# Patient Record
(demographics unavailable — no encounter records)

---

## 2024-11-03 NOTE — ASSESSMENT
[FreeTextEntry1] : Patient is a 69 yo F who presents with R kidney stone.  She is voiding well Again encouraged stone diet and K citrate Renal sono today shows relatively stable RLP stone. Will cont observation She is seeing Nephrology for medical mgmt Plan for f/u 12 mo for repeat renal sono

## 2024-11-03 NOTE — HISTORY OF PRESENT ILLNESS
[FreeTextEntry1] : Patient is a 69 yo F who presents for R intrarenal stone  HPI/Prior hx: She reports baseline urinary symptoms of nocturia x4. No fever/chills, no nausea/vomiting. No incontinence, dysuria or hematuria.  Her LUTS did seem to improve with PFPT previously. She did see PCP and UA in 9/2021 showed 9 rbcs/hpf. Cysto and renal ULT in 10/15/21 - R intrarenal stones noted, no obvious L stone. Neg cysto. She drinks ~64 oz of water and 2 cups of tea usually daily. She did have PCNL stone surgery 2012 at [facility]. Most recent renal ULT with b/l subcentimeter stones in 2020 and renal atrophy then. CT scan 3/30/22 - There is a 8mm nonobstructing R kidney stone - no hydro and stone appears to extend from R lower pole to pelvis. CT report describes as pelvic stone, but more within RLP.  9/82023 She presents to follow up today. She reports urination is better as compared to prior. She denied any urinary frequency, urgency, gross hematuria, dysuria, abdominal/flank pain or fever/chills.  She drinks 8 cups of water in the daytime, does not drink excessive water at bedtime, only sips some water at night when she feels thirsty.  Previously saw Nephrology and is taking K citrate. She has not noticed any stone passed. No recurrent urinary tract infection.  Renal ULT in 3/2023 showed right kidney mildly echogenic and atrophic with an upper pole simple cyst measuring 0.8 x 0.7 x 0.8 cm. Left kidney appears to be unremarkable. No hydronephrosis, stones or solid masses present. Renal ULT 9/2023 shows right lower pole ~8mm stone, similar to prior CT, unchanged.  No hydronephrosis.  2/2024 - Renal ULT today shows stable R 7mm stone, no hydro.  No L sided stone.  R renal atrophy/echogenicity.  11/1/24 Renal ULT today shows stable R 7mm stone with adjacent punctate stone, no hydro.  No L sided stone.  R renal atrophy/echogenicity. She is following with Nephrology/Dr Reyes.  Still on K citrate. Denies any pain, UTI/dysuria, hematuria.  She has cut down on salt and is drinking lots of water.  She does have urinary frequency from her water intake.

## 2024-11-03 NOTE — ASSESSMENT
[FreeTextEntry1] : Patient is a 71 yo F who presents with R kidney stone.  She is voiding well Again encouraged stone diet and K citrate Renal sono today shows relatively stable RLP stone. Will cont observation She is seeing Nephrology for medical mgmt Plan for f/u 12 mo for repeat renal sono

## 2024-11-03 NOTE — HISTORY OF PRESENT ILLNESS
[FreeTextEntry1] : Patient is a 69 yo F who presents for R intrarenal stone  HPI/Prior hx: She reports baseline urinary symptoms of nocturia x4. No fever/chills, no nausea/vomiting. No incontinence, dysuria or hematuria.  Her LUTS did seem to improve with PFPT previously. She did see PCP and UA in 9/2021 showed 9 rbcs/hpf. Cysto and renal ULT in 10/15/21 - R intrarenal stones noted, no obvious L stone. Neg cysto. She drinks ~64 oz of water and 2 cups of tea usually daily. She did have PCNL stone surgery 2012 at [facility]. Most recent renal ULT with b/l subcentimeter stones in 2020 and renal atrophy then. CT scan 3/30/22 - There is a 8mm nonobstructing R kidney stone - no hydro and stone appears to extend from R lower pole to pelvis. CT report describes as pelvic stone, but more within RLP.  9/82023 She presents to follow up today. She reports urination is better as compared to prior. She denied any urinary frequency, urgency, gross hematuria, dysuria, abdominal/flank pain or fever/chills.  She drinks 8 cups of water in the daytime, does not drink excessive water at bedtime, only sips some water at night when she feels thirsty.  Previously saw Nephrology and is taking K citrate. She has not noticed any stone passed. No recurrent urinary tract infection.  Renal ULT in 3/2023 showed right kidney mildly echogenic and atrophic with an upper pole simple cyst measuring 0.8 x 0.7 x 0.8 cm. Left kidney appears to be unremarkable. No hydronephrosis, stones or solid masses present. Renal ULT 9/2023 shows right lower pole ~8mm stone, similar to prior CT, unchanged.  No hydronephrosis.  2/2024 - Renal ULT today shows stable R 7mm stone, no hydro.  No L sided stone.  R renal atrophy/echogenicity.  11/1/24 Renal ULT today shows stable R 7mm stone with adjacent punctate stone, no hydro.  No L sided stone.  R renal atrophy/echogenicity. She is following with Nephrology/Dr Reyes.  Still on K citrate. Denies any pain, UTI/dysuria, hematuria.  She has cut down on salt and is drinking lots of water.  She does have urinary frequency from her water intake.

## 2024-12-30 NOTE — PHYSICAL EXAM
[No Acute Distress] : no acute distress [Well Nourished] : well nourished [Well Developed] : well developed [Well-Appearing] : well-appearing [Normal Outer Ear/Nose] : the outer ears and nose were normal in appearance [Normal Oropharynx] : the oropharynx was normal [Supple] : supple [No Respiratory Distress] : no respiratory distress  [No Accessory Muscle Use] : no accessory muscle use [Clear to Auscultation] : lungs were clear to auscultation bilaterally [Normal Rate] : normal rate  [Regular Rhythm] : with a regular rhythm [Normal S1, S2] : normal S1 and S2 [No Edema] : there was no peripheral edema [Soft] : abdomen soft [Non Tender] : non-tender [Non-distended] : non-distended [Speech Grossly Normal] : speech grossly normal [Memory Grossly Normal] : memory grossly normal [Normal Affect] : the affect was normal [Normal Mood] : the mood was normal

## 2024-12-31 NOTE — HISTORY OF PRESENT ILLNESS
[FreeTextEntry1] : follow up [de-identified] : 69F with multiple severe allergies, chronic idiopathic urticaria, dermatitis, osteoporosis, recurrent kidney stones, R renal atrophy, HTN, HLD, GERD, pseudogout, and latent TB s/p treatment presenting for follow up. Patient states she has been doing well. 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 She is enjoying her work. She sometimes feels scratchy/itchiness in her throat that improves with nasal sprays. Denies trouble breathing or rash. She continues to be cautious with her eating and walks a lot. Missed her endocrine appointment but will reschedule. Got her repeat thyroid US done. Saw urology in the interim, kidney stone stable. She is taking potassium citrate. Denies dysuria or hematuria. Has not seen GI for colonoscopy, still does not feel ready to go through it.

## 2024-12-31 NOTE — ASSESSMENT
[FreeTextEntry1] : 69F with multiple severe allergies, chronic idiopathic urticaria, dermatitis, osteoporosis, recurrent kidney stones, R renal atrophy, HTN, HLD, GERD, pseudogout, and latent TB s/p treatment presenting for follow up.  #HCM - received flu & COVID in 10/2024 at outside pharmacy per patient - UTD on vaccines, received 2 doses for shingles (not recorded in chart) - mammo in 2026, finished cervical cancer screening - FIT test negative, repeat with next CPE as patient refused colonoscopy - repeat DEXA in 5/2025   Cased discussed with Dr. Milton RTC in 5/2024 for CPE  Micaela Echevarria PGY-3, [employer] [facility]

## 2024-12-31 NOTE — REVIEW OF SYSTEMS
[Fever] : no fever [Nosebleed] : no nosebleeds [Chest Pain] : no chest pain [Shortness Of Breath] : no shortness of breath [Cough] : no cough [Abdominal Pain] : no abdominal pain [Diarrhea] : diarrhea [Dysuria] : no dysuria [Hematuria] : no hematuria [Skin Rash] : no skin rash

## 2024-12-31 NOTE — HISTORY OF PRESENT ILLNESS
[FreeTextEntry1] : follow up [de-identified] : 69F with multiple severe allergies, chronic idiopathic urticaria, dermatitis, osteoporosis, recurrent kidney stones, R renal atrophy, HTN, HLD, GERD, pseudogout, and latent TB s/p treatment presenting for follow up. Patient states she has been doing well. 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 She is enjoying her work. She sometimes feels scratchy/itchiness in her throat that improves with nasal sprays. Denies trouble breathing or rash. She continues to be cautious with her eating and walks a lot. Missed her endocrine appointment but will reschedule. Got her repeat thyroid US done. Saw urology in the interim, kidney stone stable. She is taking potassium citrate. Denies dysuria or hematuria. Has not seen GI for colonoscopy, still does not feel ready to go through it.

## 2025-01-14 NOTE — END OF VISIT
[] : Fellow [FreeTextEntry3] : Patient is a 71F here for follow up. For osteoporosis, clinically stable while on fosamax weekly since 2020, resumed after brief drug holiday in 2019 due to BMD decrease in LS. Recent DXA on 2023 with no signifcant BMD change. Will repeat DXA this year to trend. Patient has planned dental work but not sure when. Advised to inform us when dental procedure is, so fosamax maybe held 4 weeks appropriately. Blood work to be done with pcp for vitD and Ca For thyroid nodules, stable multinodular goiter. Denies compressive symptoms. Will monitor with annual thyroid US later this year. [Time Spent: ___ minutes] : I have spent [unfilled] minutes of time on the encounter which excludes teaching and separately reported services.

## 2025-01-14 NOTE — HISTORY OF PRESENT ILLNESS
[FreeTextEntry1] : 71 yr F with osteoporosis, thyroid nodule, hx of latent TB, HTN, HLD, multiple allergies, chronic urticaria following up for management of osteoporosis and thyroid nodule.   Osteoporosis, postmenopausal: Diagnosed 2014 (DEXA June 2014: spine -2.5, T-hip -2.9, and fem neck -3.2).  No hx of parathyroid disorders. Never on HRT. No chronic steroid use, previously intermittent use of steroids when patient has allergic reactions. Now on Xolair (Omalizumab). At age 43 patient had menopause. Was initiated on Alendronate and continued for 5 years (June 2014-2019) with good response to therapy, then took drug holiday. Drug holiday ended July 2020 when DEXA showed worsening of spine (-1.4 in June 2018 to -1.9 in July 2020) (although total hip significantly improved). She was restarted on Alendronate 70 mg/weekly at that time and has since been continuing. Patient fractured ribs 6 and 7 in March 2021 after mechanical fall from standing height. No other fractures as a result of that fall. No additional falls or fractures. PCP stopped calcium 600 mg daily due to kidney impairment and kidney stones some time ago. 24 hr urine did not show hypercalciuria. DEXA in Aug 2021 stable compared to previous but did have improvement in spine (4.7%). DEXA in April 2023 had worsening in spine, stable in fem neck, improvement in total hip.  Walks 30-60 minutes daily. Takes Vit D 1000 units daily. UTD with dental. Has an impacted wisdom tooth that needs extraction. She is not sure when this will be scheduled for, not urgent. No back pain. No interval falls/fractures  Bone Densities as follows   June 2014 DEXA Spine -2.5 T hip -2.9 Fem neck -3.2  June 2016 Spine -1.9 T hip -2.5 Fem neck -3.1  June 2018 Spine -1.4 T hip -2.5 Fem neck -2.8  July 2020 Spine -1.9 T hip -1.8 Fem Neck -2.8  August 2021: Spine -1.6 (+4.7%) Total Hip -2.0  Fem neck -2.9   April 2023 Spine -2.2 Total hip -1.8 (+4.4%) Fem neck -2.9  Thyroid Nodule: First noted on CT scan in 2013 (performed for tracheal deviation seen on CXR)  Thyroid sono 1/2020 showed 3.2cm solid isoechoic nodule in right mid pole and similar appearing 1cm nodule in right upper pole. 8/2021 sono was unchanged. FNA of nodule 7/2020 which was benign, consistent with nodular goiter. TSH 1.43 (2019), Free T4 1.33 (2020), TPO Ab wnl (2020). Thyroid US 4/2023 was stable: R nodule 3.3x2.8x2.3 isoechoic nodule. Smaller subcm nodule in R lobe. Subcm colloid cysts in L lobe. Thyroid US 11/2024: stable 3.2cm RLP nodule. subcm RUP nodule and scattered left subcm spongiform nodules/colloid cysts. New 1.5cm isthmus nodule noted.  Patient does not report SOB, dysphagia, neck pain or growth in neck, no symptoms of hypo/hyperthyroidism. No hx of head or neck radiation. Mother has history of thyroid nodule and paternal aunt has thyroid disease.  Prediabetes A1c 5.9% in 5/2024. Not on medication. On primarily plant based diet. Keeps active.

## 2025-01-14 NOTE — ASSESSMENT
[FreeTextEntry1] : 71 yr F with osteoporosis, thyroid nodule, hx of latent TB, HTN, HLD, multiple allergies, chronic urticaria following up for management of osteoporosis and thyroid nodule.   #Osteoporosis, post-menopausal S/p Alendronate course x 5 years ([id number]) with good response in BMD. Went on drug holiday in 2019. Re-started Alendronate in 2020 due slightly worsening of osteopenia in spine, however there was significant improvement in total hip. Last DEXA 4/2023 with worsening in spine (-1.6 to -2.2) still within osteopenic range, stable in fem neck, and improvement in total hip (+4.4%). - continue Alendronate 70mg weekly given previous very good response, although worsening in spine it is still within osteopenic range, improvement in total hip.  - appropriate calcium in diet discussed. patient eating adequate vegetables with good calcium content. - c/w vitamin D 1000 IU daily. Taken off calcium by PCP due to hx of kidney stones. - Needs extraction of impacted wisdom tooth, has no scheduled date. pt advised to let us know when this will be scheduled. Advised to hold alendronate 1 month before and 1 month after procedure, and to let her dentist know as well. - Repeat DEXA 4/2025. Consider holiday vs transition to another med (? anabolic given persistent osteoporosis in FN)  #Thyroid Nodule  3.3 cm isoechoic right sided nodule, s/p FNA 7/2020 - Category II (benign). Clinically and biochemically euthyroid. No compressive symptoms. Thyroid US 11/2024: stable 3.2cm RLP nodule. subcm RUP nodule and scattered left subcm spongiform nodules/colloid cysts. New 1.5cm isthmus nodule noted. - repeat thyroid US 11/2025  #Prediabetes - A1c 5.9% - encouraged to c/w healthy diet and regular exercise   To do next visit: consider change to anabolic agent vs drug holiday pending DEXA results   RTC in 6 months   Seen and discussed with Dr. Karen Burris MD Endocrine Fellow

## 2025-02-05 NOTE — PHYSICAL EXAM
[Alert] : alert [Oriented x 3] : ~L oriented x 3 [Well Nourished] : well nourished [Conjunctiva Non-injected] : conjunctiva non-injected [No Visual Lymphadenopathy] : no visual  lymphadenopathy [No Clubbing] : no clubbing [No Edema] : no edema [No Bromhidrosis] : no bromhidrosis [No Chromhidrosis] : no chromhidrosis [FreeTextEntry3] : - Pink scaling in bilateral conchal bowls (R>L) - keratotic plaque on lateral periungal border a/w onycholysis - diffuse xerosis

## 2025-02-05 NOTE — ASSESSMENT
[FreeTextEntry1] : #Seborrheic dermatitis, bilateral ears and scalp, recurrent chronic mild flare today We have discussed the nature and course of this condition. We have discussed treatment options, expectations from treatment, and associated side effects of topical therapies. - Reassured patient the triamcinolone ointment should not contain benzoyl alcohol - restart triamcinolone 0.1% ointment BID to affected area only, no more than 2 weeks, avoid face and groin - r/b/a topical steroids were discussed including but not limited to thinning of the skin, atrophy and dyspigmentation. - SCALP: keto shampoo and lidex solution  #Dermatographism - ok to use triamcinolone 0.1% ointment BID to affected area only, no more than 2 weeks, avoid face and groin  #eyelid dermatitis, likely component of ACD, stable today - TRUE Patch testing in 2016 with +fragrance mix - [employer]: 2+ Methyldibromo glutaronitrile, 2+ Fragrance Mix I - continue elidel 1-2 times a day as needed. SED - c/w genteal drops, may need preservative free ones. 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

## 2025-02-05 NOTE — HISTORY OF PRESENT ILLNESS
[FreeTextEntry1] : FUV: rash [de-identified] : 71 year old F with chronic idiopathic urticaria on xolair here for f/u  #seb derm on bilateral ears x years-- improves w/ TAC ointment but ran out, also gets scaly in scalp  # eyelid rash prev well controlled with elidel although recently flaring after cataract surgery and using systane. Recently switched to Genteal which is propylene glycol free, rash has not recurred - Avoiding allergens below - [employer]: 2+ Methyldibromo glutaronitrile, 2+ Fragrance Mix I  #Chronic idiopathic urticaria - MONA index negative.  - Taking zyrtec 10mg once-twice daily and Xolair 300mg q4 weeks. Started Xolair in August 2022 and reports no more urticaria but does endorse dermatographism  #paronychia - resolved s/p using clobetasol however her right thumb was painful ~1 month ago, and her PCP gave her oral terbinafine 250mg daily which she has been taking x 2 weeks. 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

## 2025-03-05 NOTE — CURRENT MEDS
[TextEntry] : Atorvastatin Cetirizine 10mg Benadryl Losartan 25mg qd Fluticasone nasal spray Azelastine Alendronate Hydrocortisone oint Eye Itch eye gtt Famotidine prn Tacrolimus ointment for atopic dermatitis Terbinafine for nails Potassium citrate 15meq bid

## 2025-03-05 NOTE — ASSESSMENT
[FreeTextEntry1] : CIU with excellent response to Xolair:  Continue treatment every 6 weeks  Zyrtec 10 mg QD prn symptoms  Patient will discuss with her GI what anesthesia is administered for colonoscopy  Her GERD symptoms are resolved with change in diet and she did not have follow up endoscopy performed.

## 2025-03-05 NOTE — REASON FOR VISIT
[Home] : at home, [unfilled] , at the time of the visit. [Medical Office: ([facility] [place])___] : at the medical office located in  [Telehealth (audio & video)] : This visit was provided via telehealth using real-time 2-way audio visual technology. [Verbal consent obtained from patient] : the patient, [unfilled] [Follow-Up] : a follow-up visit

## 2025-03-05 NOTE — HISTORY OF PRESENT ILLNESS
[FreeTextEntry1] : 71F with hx of latent TB s/p tx in April 2020, osteoporosis, recurrent kidney stones and R kidney atrophy presenting for follow up via video/phone call.  Patient states that since starting the kcitrate 15meq bid she has one small stones per .  She last saw Dr. Heard and he told her she had a little stone. She has no pain now. Passed no stones since she last saw me. She is drinking 64oz of water and two cups of chamomile tea. She has no flank pain, no blood in the urine, no dysuria, no nausea, vomiting or diarrhea.

## 2025-03-05 NOTE — PLAN
[TextEntry] : New stone on imaging with  in Sept 23. Will order repeat 24hr UA. This 24hr UA with great results. Cont citrate 15meq bid. Will cont to avoid nuts and kale. Will cont 2L of fluid intake at minimum. Will follow up with Dr. Heard in October and based on US will determine if she needs to be seen sooner. Will do labs at that time, ordered in the system. Cont current diet and fluid regimen.

## 2025-03-05 NOTE — HISTORY OF PRESENT ILLNESS
[de-identified] : Patient is treated with Xolair every 6 weeks and she reports doing extremely well with this treatment.    In addition, she has continued with Zyrtec 10 mg QD as needed - she was advised on last visit to see her GI MD for throat clearing and she reports.    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   The dog is removed from the first level of the home so that she is not exposed.     2014 - tooth extraction - infection - ER - morphine - SOB - LOC - tracheotomy - admitted to 8 days at [facility] - she was not advised of the cause of the reaction   She is scheduled for colonoscopy and was concerned about what is given to her for procedure.

## 2025-03-05 NOTE — PHYSICAL EXAM
[Alert] : alert [Well Nourished] : well nourished [No Acute Distress] : no acute distress [Well Developed] : well developed [No Neck Mass] : no neck mass was observed [No LAD] : no lymphadenopathy [Normal Rate and Effort] : normal respiratory rhythm and effort [No Crackles] : no crackles [Normal Rate] : heart rate was normal  [Normal S1, S2] : normal S1 and S2 [Skin Intact] : skin intact

## 2025-05-21 NOTE — PHYSICAL EXAM
[Normal] : no rash [Coordination Grossly Intact] : coordination grossly intact [No Focal Deficits] : no focal deficits

## 2025-05-25 NOTE — HISTORY OF PRESENT ILLNESS
[FreeTextEntry1] : follow up [de-identified] : 71F with multiple severe allergies, chronic idiopathic urticaria, dermatitis, osteoporosis, recurrent kidney stones, R renal atrophy, HTN, HLD, GERD, pseudogout, and latent TB s/p treatment presenting for follow up. Patient states she has been doing well with no complaints. She has had kidney stones for which she takes potassium citrate and had a repeat ultrasound showing R renal atrophy but no visualized stones. She follows with an allergist and her symptoms have been mostly well controlled recently.

## 2025-05-25 NOTE — HISTORY OF PRESENT ILLNESS
[FreeTextEntry1] : follow up [de-identified] : 71F with multiple severe allergies, chronic idiopathic urticaria, dermatitis, osteoporosis, recurrent kidney stones, R renal atrophy, HTN, HLD, GERD, pseudogout, and latent TB s/p treatment presenting for follow up. Patient states she has been doing well with no complaints. She has had kidney stones for which she takes potassium citrate and had a repeat ultrasound showing R renal atrophy but no visualized stones. She follows with an allergist and her symptoms have been mostly well controlled recently.

## 2025-05-25 NOTE — HISTORY OF PRESENT ILLNESS
[FreeTextEntry1] : follow up [de-identified] : 71F with multiple severe allergies, chronic idiopathic urticaria, dermatitis, osteoporosis, recurrent kidney stones, R renal atrophy, HTN, HLD, GERD, pseudogout, and latent TB s/p treatment presenting for follow up. Patient states she has been doing well with no complaints. She has had kidney stones for which she takes potassium citrate and had a repeat ultrasound showing R renal atrophy but no visualized stones. She follows with an allergist and her symptoms have been mostly well controlled recently.

## 2025-07-15 NOTE — END OF VISIT
[] : Fellow [FreeTextEntry3] : Patient is a 71F with osteoporosis- with up to 5 years of alendronate weekly therapy, DXA this year shows significant decrease in BMD in LS to T score -3.1, with remaining sites no significant change. Given continued decline in BMD in femoral neck, blunted effect of anabolic agent after years of antiresorptive, and its weak data on non-vertebral fx risk improvement, will transition to IV reclast. Risk and benefit of reclast explained. Check Ca and vitD. Also repeat thyroid US this year for known thyroid nodules. [Time Spent: ___ minutes] : I have spent [unfilled] minutes of time on the encounter which excludes teaching and separately reported services.

## 2025-07-15 NOTE — REVIEW OF SYSTEMS
[Back Pain] : back pain [TextEntry] :  REVIEW OF SYSTEMS: General: No fatigue, no weight gain, no weight loss Respiratory: No cough, no shortness of breath  Cardiovascular: No chest pain, no palpitations, no leg swelling  Gastrointestinal: No nausea, no vomiting, no constipation, no diarrhea  Musculoskeletal: No muscle aches, no joint pain, no recent fractures Neurologic: No tremors, no syncopal episodes Psychiatric: The patient is not currently nervous or anxious  Endocrine: No thyroid/neck swelling, no heat/cold intolerance   The review of systems is otherwise negative except as noted in HPI. [Fatigue] : no fatigue [Fever] : no fever [Chills] : no chills [Dysphagia] : no dysphagia [Neck Pain] : no neck pain [Shortness Of Breath] : no shortness of breath [Cough] : no cough [Nausea] : no nausea [Constipation] : no constipation [Abdominal Pain] : no abdominal pain [Vomiting] : no vomiting [Diarrhea] : no diarrhea [Polyuria] : no polyuria [Dysuria] : no dysuria [Tremors] : no tremors [Pain/Numbness of Digits] : no pain/numbness of digits [Cold Intolerance] : no cold intolerance [Heat Intolerance] : no heat intolerance [Swelling] : no swelling

## 2025-07-15 NOTE — HISTORY OF PRESENT ILLNESS
[FreeTextEntry1] : 71 yr F with PMHx of osteoporosis, thyroid nodule, hx of latent TB, HTN, HLD, multiple allergies, chronic urticaria following up for management of osteoporosis and thyroid nodule.   Osteoporosis, postmenopausal: Diagnosed 2014 (DEXA June 2014: spine -2.5, T-hip -2.9, and fem neck -3.2).  No hx of parathyroid disorders. Never on HRT. No chronic steroid use, previously intermittent use of steroids when patient had allergic reactions. Now on Xolair (Omalizumab). At age 43 patient had menopause. Was initiated on Alendronate and continued for 5 years (June 2014-2019) with good response to therapy, then took drug holiday. Drug holiday ended July 2020 when DEXA showed worsening of spine (-1.4 in June 2018 to -1.9 in July 2020) (although total hip significantly improved). She was restarted on Alendronate 70 mg/weekly at that time and has since been continuing. Patient fractured ribs 6 and 7 in March 2021 after mechanical fall from standing height. No other fractures as a result of that fall. No additional falls or fractures. PCP stopped calcium 600 mg daily due to kidney impairment and kidney stones some time ago. 24 hr urine did not show hypercalciuria. DEXA in Aug 2021 stable compared to previous but did have improvement in spine (4.7%). DEXA in April 2023 had worsening in spine, stable in fem neck, improvement in total hip. DEXA in April 2025 had worsening in femoral neck, consistent with osteoporosis. Hip and spine stable.  Walks 1-2 hours daily. Takes Vit D 1000 units daily. Has an impacted wisdom tooth that needs extraction. She is not sure when this will be scheduled for, not urgent. Endorses back pain. No interval falls/fractures  Bone Densities as follows   June 2014 DEXA Spine -2.5 T hip -2.9 Fem neck -3.2  June 2016 Spine -1.9 T hip -2.5 Fem neck -3.1  June 2018 Spine -1.4 T hip -2.5 Fem neck -2.8  July 2020 Spine -1.9 T hip -1.8 Fem Neck -2.8  August 2021: Spine -1.6 (+4.7%) Total Hip -2.0  Fem neck -2.9   April 2023 Spine -2.2 Total hip -1.8 (+4.4%) Fem neck -2.9  April 2025 Femoral neck: -3.1 Total hip: -1.7 Spine, L4 excluded: -1.9   Thyroid Nodule: First noted on CT scan in 2013 (performed for tracheal deviation seen on CXR)  Thyroid sono 1/2020 showed 3.2cm solid isoechoic nodule in right mid pole and similar appearing 1cm nodule in right upper pole. 8/2021 sono was unchanged. FNA of nodule 7/2020 which was benign, consistent with nodular goiter. TSH 1.43 (2019), Free T4 1.33 (2020), TPO Ab wnl (2020). Thyroid US 4/2023 was stable: R nodule 3.3x2.8x2.3 isoechoic nodule. Smaller subcm nodule in R lobe. Subcm colloid cysts in L lobe. Thyroid US 11/2024: stable 3.2cm RLP nodule. subcm RUP nodule and scattered left subcm spongiform nodules/colloid cysts. New 1.5cm isthmus nodule noted.  Patient does not report SOB, dysphagia, neck pain or growth in neck, no symptoms of hypo/hyperthyroidism. No hx of head or neck radiation. Mother has history of thyroid nodule and paternal aunt has thyroid disease.  Prediabetes A1c 5.9% in 5/2024. Not on medication. On primarily plant-based diet and occasionally eats meat. Keeps active.

## 2025-07-15 NOTE — PHYSICAL EXAM
[Alert] : alert [No Acute Distress] : no acute distress [Normal Sclera/Conjunctiva] : normal sclera/conjunctiva [EOMI] : extra ocular movement intact [No Proptosis] : no proptosis [No Lid Lag] : no lid lag [No Neck Mass] : no neck mass was observed [Supple] : the neck was supple [No Respiratory Distress] : no respiratory distress [No Accessory Muscle Use] : no accessory muscle use [No Murmurs] : no murmurs [Normal Rate] : heart rate was normal [Regular Rhythm] : with a regular rhythm [No Stigmata of Cushings Syndrome] : no stigmata of Cushings Syndrome [Oriented x3] : oriented to person, place, and time [Normal Affect] : the affect was normal [TextEntry] : PHYSICAL EXAMINATION: Vital signs from today's encounter reviewed.  GENERAL: No acute distress, clinically eukinetic, normal appearance HEAD: Normocephalic, atraumatic EYES: conjunctivae are pink and moist, no icterus, no proptosis  NECK: thyroid is not enlarged/nodular on palpation, non-tender, no adenopathy CARDIOVASCULAR: well-perfused extremities, no peripheral edema RESPIRATORY: normal chest expansion with good pulmonary effort, no acute respiratory distress MUSCULOSKELETAL: no swelling, normal range of motion, normal gait SKIN: no pallor, no icterus, no rash  NEUROLOGIC: alert and oriented, no evident focal deficits, no tremors  PSYCHIATRIC: mood and affect are normal ENDOCRINE: No obvious stigmata of Cushing's or acromegaly present

## 2025-07-15 NOTE — ASSESSMENT
[FreeTextEntry1] : 71 yr F with osteoporosis, thyroid nodule, hx of latent TB, HTN, HLD, multiple allergies, chronic urticaria following up for management of osteoporosis and thyroid nodule.   #Osteoporosis, post-menopausal S/p Alendronate course x 5 years ([id number]) with good response in BMD. Went on drug holiday in 2019. Re-started Alendronate in 2020 due slightly worsening of osteopenia in spine, however there was significant improvement in total hip. Last DEXA 4/2023 with worsening in spine (-1.6 to -2.2) still within osteopenic range, stable in fem neck, and improvement in total hip (+4.4%). - continue Alendronate 70mg weekly given previous very good response, although worsening in spine it is still within osteopenic range, improvement in total hip.  - appropriate calcium in diet discussed. patient eating adequate vegetables with good calcium content. - c/w vitamin D 1000 IU daily. Taken off calcium by PCP due to hx of kidney stones. Most recent US kidney negative for kidney stones. - Needs extraction of impacted wisdom tooth, has no scheduled date. pt advised to let us know when this will be scheduled. Advised to hold alendronate 1 month before and 1 month after procedure, and to let her dentist know as well. - Repeat DEXA 4/2025 with persistent osteoporosis of femoral neck, T score -3.1 significant decrease. Other sites no significant BMD change - will stop alendronate given patient has used it for 10 years total and most recent therapy was for 5 years - will start zoledronic acid 5mg/100ml infusion q yearly - pre infusion CMP and vitamin D levels ordered - consent for infusion obtained and, in the chart   #Thyroid Nodule  3.3 cm isoechoic right sided nodule, s/p FNA 7/2020 - Category II (benign). Clinically and biochemically euthyroid. No compressive symptoms. Thyroid US 11/2024: stable 3.2cm RLP nodule. subcm RUP nodule and scattered left subcm spongiform nodules/colloid cysts. New 1.5cm isthmus nodule noted. - repeat thyroid US ordered 2025  #Hx of Prediabetes - recent A1c 5.9% - encouraged to c/w healthy diet and regular exercise   #HLD -LDL at 71 -continue atorvastatin  RTC in 6 months   Seen and discussed with Dr. Karen Lim, DO Endocrine Fellow

## 2025-07-21 NOTE — ASSESSMENT
[FreeTextEntry1] : Patient is a 70 yo F who presents with h/o R kidney stone. She reports recent back pain - initially L then R.  D/w pt that based on hx appears more MSK in etiology.  Does not appear c/w renal colic Will check renal sono today in office Prior sono in 3/20/25 unrevealing and recent labs wnl   Renal sono in office today - shows small RLP nonobstructing renal stone c/w findings from prior [id number] imaging. D/w pt stone diet recs, f/u with Nephrology  F/u 1 yr for renal sono

## 2025-07-21 NOTE — PHYSICAL EXAM
[General Appearance - Well Developed] : well developed [Normal Appearance] : normal appearance [General Appearance - In No Acute Distress] : no acute distress [Abdomen Soft] : soft [Abdomen Tenderness] : non-tender [Costovertebral Angle Tenderness] : no ~M costovertebral angle tenderness